# Patient Record
Sex: MALE | URBAN - METROPOLITAN AREA
[De-identification: names, ages, dates, MRNs, and addresses within clinical notes are randomized per-mention and may not be internally consistent; named-entity substitution may affect disease eponyms.]

---

## 2023-04-28 ENCOUNTER — OFFICE VISIT (OUTPATIENT)
Dept: INTERNAL MEDICINE CLINIC | Age: 23
End: 2023-04-28

## 2023-04-28 VITALS
SYSTOLIC BLOOD PRESSURE: 103 MMHG | BODY MASS INDEX: 27.96 KG/M2 | HEIGHT: 77 IN | RESPIRATION RATE: 8 BRPM | DIASTOLIC BLOOD PRESSURE: 64 MMHG | OXYGEN SATURATION: 98 % | WEIGHT: 236.8 LBS | TEMPERATURE: 98.2 F | HEART RATE: 84 BPM

## 2023-04-28 DIAGNOSIS — Z00.00 ROUTINE PHYSICAL EXAMINATION: ICD-10-CM

## 2023-04-28 DIAGNOSIS — Z51.81 MEDICATION MONITORING ENCOUNTER: Primary | ICD-10-CM

## 2023-04-28 DIAGNOSIS — R07.9 CHEST PAIN, UNSPECIFIED TYPE: ICD-10-CM

## 2023-04-28 DIAGNOSIS — K21.9 GASTROESOPHAGEAL REFLUX DISEASE WITHOUT ESOPHAGITIS: ICD-10-CM

## 2023-04-28 DIAGNOSIS — Z11.59 NEED FOR HEPATITIS C SCREENING TEST: ICD-10-CM

## 2023-04-28 RX ORDER — MIRTAZAPINE 30 MG/1
TABLET, FILM COATED ORAL
COMMUNITY

## 2023-04-28 RX ORDER — AMPHETAMINE 9.4 MG/1
TABLET, ORALLY DISINTEGRATING ORAL
COMMUNITY

## 2023-04-28 NOTE — PROGRESS NOTES
Chief Complaint   Patient presents with    Establish Care          1. \"Have you been to the ER, urgent care clinic since your last visit? Hospitalized since your last visit? \" No    2. \"Have you seen or consulted any other health care providers outside of the [address] [place] [address] since your last visit? \" No     3. For patients aged 39-70: Has the patient had a colonoscopy / FIT/ Cologuard? NA - based on age      If the patient is female:    4. For patients aged 41-77: Has the patient had a mammogram within the past 2 years? NA - based on age or sex      11. For patients aged 21-65: Has the patient had a pap smear?  NA - based on age or sex
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
## 2023-04-28 NOTE — PATIENT INSTRUCTIONS
avoid fried and fatty foods. peppermint, chocolate, alcohol, coffee, citrus fruits and juices, tomoato products; avoid lying down for 2 to 3 hours after eating.

## 2023-04-29 LAB
ALBUMIN SERPL-MCNC: 4.6 G/DL (ref 3.5–5)
ALBUMIN/GLOB SERPL: 1.4 (ref 1.1–2.2)
ALP SERPL-CCNC: 84 U/L (ref 45–117)
ALT SERPL-CCNC: 23 U/L (ref 12–78)
ANION GAP SERPL CALC-SCNC: 5 MMOL/L (ref 5–15)
AST SERPL-CCNC: 15 U/L (ref 15–37)
BASOPHILS # BLD: 0 K/UL (ref 0–0.1)
BASOPHILS NFR BLD: 1 % (ref 0–1)
BILIRUB SERPL-MCNC: 0.6 MG/DL (ref 0.2–1)
BUN SERPL-MCNC: 13 MG/DL (ref 6–20)
BUN/CREAT SERPL: 13 (ref 12–20)
CALCIUM SERPL-MCNC: 9.8 MG/DL (ref 8.5–10.1)
CHLORIDE SERPL-SCNC: 108 MMOL/L (ref 97–108)
CHOLEST SERPL-MCNC: 199 MG/DL
CO2 SERPL-SCNC: 27 MMOL/L (ref 21–32)
CREAT SERPL-MCNC: 1.02 MG/DL (ref 0.7–1.3)
DIFFERENTIAL METHOD BLD: ABNORMAL
EOSINOPHIL # BLD: 0.1 K/UL (ref 0–0.4)
EOSINOPHIL NFR BLD: 1 % (ref 0–7)
ERYTHROCYTE [DISTWIDTH] IN BLOOD BY AUTOMATED COUNT: 12.1 % (ref 11.5–14.5)
EST. AVERAGE GLUCOSE BLD GHB EST-MCNC: 91 MG/DL
GLOBULIN SER CALC-MCNC: 3.2 G/DL (ref 2–4)
GLUCOSE SERPL-MCNC: 94 MG/DL (ref 65–100)
HBA1C MFR BLD: 4.8 % (ref 4–5.6)
HCT VFR BLD AUTO: 50.9 % (ref 36.6–50.3)
HCV AB SER IA-ACNC: >11 INDEX
HCV AB SERPL QL IA: REACTIVE
HDLC SERPL-MCNC: 42 MG/DL
HDLC SERPL: 4.7 (ref 0–5)
HGB BLD-MCNC: 17.6 G/DL (ref 12.1–17)
IMM GRANULOCYTES # BLD AUTO: 0 K/UL (ref 0–0.04)
IMM GRANULOCYTES NFR BLD AUTO: 0 % (ref 0–0.5)
LDLC SERPL CALC-MCNC: 135.8 MG/DL (ref 0–100)
LYMPHOCYTES # BLD: 1.9 K/UL (ref 0.8–3.5)
LYMPHOCYTES NFR BLD: 31 % (ref 12–49)
MCH RBC QN AUTO: 31.9 PG (ref 26–34)
MCHC RBC AUTO-ENTMCNC: 34.6 G/DL (ref 30–36.5)
MCV RBC AUTO: 92.2 FL (ref 80–99)
MONOCYTES # BLD: 0.6 K/UL (ref 0–1)
MONOCYTES NFR BLD: 10 % (ref 5–13)
NEUTS SEG # BLD: 3.5 K/UL (ref 1.8–8)
NEUTS SEG NFR BLD: 57 % (ref 32–75)
NRBC # BLD: 0 K/UL (ref 0–0.01)
NRBC BLD-RTO: 0 PER 100 WBC
PLATELET # BLD AUTO: 297 K/UL (ref 150–400)
PMV BLD AUTO: 11 FL (ref 8.9–12.9)
POTASSIUM SERPL-SCNC: 4.3 MMOL/L (ref 3.5–5.1)
PROT SERPL-MCNC: 7.8 G/DL (ref 6.4–8.2)
RBC # BLD AUTO: 5.52 M/UL (ref 4.1–5.7)
SODIUM SERPL-SCNC: 140 MMOL/L (ref 136–145)
TRIGL SERPL-MCNC: 106 MG/DL (ref ?–150)
TSH SERPL DL<=0.05 MIU/L-ACNC: 0.61 UIU/ML (ref 0.36–3.74)
VLDLC SERPL CALC-MCNC: 21.2 MG/DL
WBC # BLD AUTO: 6.1 K/UL (ref 4.1–11.1)

## 2023-05-17 RX ORDER — AMPHETAMINE 9.4 MG/1
TABLET, ORALLY DISINTEGRATING ORAL
COMMUNITY

## 2023-05-17 RX ORDER — MIRTAZAPINE 30 MG/1
TABLET, FILM COATED ORAL
COMMUNITY

## 2024-01-15 ENCOUNTER — HOSPITAL ENCOUNTER (EMERGENCY)
Facility: HOSPITAL | Age: 24
Discharge: ANOTHER HOSPITAL | End: 2024-01-15
Attending: EMERGENCY MEDICINE
Payer: COMMERCIAL

## 2024-01-15 ENCOUNTER — HOSPITAL ENCOUNTER (INPATIENT)
Facility: HOSPITAL | Age: 24
LOS: 3 days | Discharge: HOME | DRG: 885 | End: 2024-01-18
Attending: PSYCHIATRY & NEUROLOGY | Admitting: PSYCHIATRY & NEUROLOGY
Payer: COMMERCIAL

## 2024-01-15 VITALS
HEART RATE: 69 BPM | RESPIRATION RATE: 18 BRPM | BODY MASS INDEX: 26.55 KG/M2 | DIASTOLIC BLOOD PRESSURE: 71 MMHG | TEMPERATURE: 99.1 F | WEIGHT: 229.5 LBS | OXYGEN SATURATION: 95 % | HEIGHT: 78 IN | SYSTOLIC BLOOD PRESSURE: 127 MMHG

## 2024-01-15 DIAGNOSIS — G47.00 INSOMNIA, UNSPECIFIED TYPE: Primary | ICD-10-CM

## 2024-01-15 DIAGNOSIS — F22 PARANOIA (HCC): ICD-10-CM

## 2024-01-15 DIAGNOSIS — F25.0 SCHIZOAFFECTIVE DISORDER, BIPOLAR TYPE (HCC): Primary | ICD-10-CM

## 2024-01-15 LAB
ALBUMIN SERPL-MCNC: 4 G/DL (ref 3.5–5)
ALBUMIN/GLOB SERPL: 1.3 (ref 1.1–2.2)
ALP SERPL-CCNC: 70 U/L (ref 45–117)
ALT SERPL-CCNC: 20 U/L (ref 12–78)
AMPHET UR QL SCN: NEGATIVE
ANION GAP SERPL CALC-SCNC: 6 MMOL/L (ref 5–15)
APAP SERPL-MCNC: 3 UG/ML (ref 10–30)
APPEARANCE UR: CLEAR
AST SERPL-CCNC: 12 U/L (ref 15–37)
BACTERIA URNS QL MICRO: NEGATIVE /HPF
BARBITURATES UR QL SCN: NEGATIVE
BASOPHILS # BLD: 0 K/UL (ref 0–0.1)
BASOPHILS NFR BLD: 1 % (ref 0–1)
BENZODIAZ UR QL: NEGATIVE
BILIRUB SERPL-MCNC: 0.6 MG/DL (ref 0.2–1)
BILIRUB UR QL: NEGATIVE
BUN SERPL-MCNC: 6 MG/DL (ref 6–20)
BUN/CREAT SERPL: 6 (ref 12–20)
CALCIUM SERPL-MCNC: 8.8 MG/DL (ref 8.5–10.1)
CANNABINOIDS UR QL SCN: NEGATIVE
CHLORIDE SERPL-SCNC: 107 MMOL/L (ref 97–108)
CO2 SERPL-SCNC: 26 MMOL/L (ref 21–32)
COCAINE UR QL SCN: NEGATIVE
COLOR UR: ABNORMAL
COMMENT:: NORMAL
CREAT SERPL-MCNC: 1.08 MG/DL (ref 0.7–1.3)
DIFFERENTIAL METHOD BLD: ABNORMAL
EKG ATRIAL RATE: 85 BPM
EKG DIAGNOSIS: NORMAL
EKG P AXIS: 56 DEGREES
EKG P-R INTERVAL: 134 MS
EKG Q-T INTERVAL: 368 MS
EKG QRS DURATION: 94 MS
EKG QTC CALCULATION (BAZETT): 437 MS
EKG R AXIS: 70 DEGREES
EKG T AXIS: 40 DEGREES
EKG VENTRICULAR RATE: 85 BPM
EOSINOPHIL # BLD: 0 K/UL (ref 0–0.4)
EOSINOPHIL NFR BLD: 1 % (ref 0–7)
EPITH CASTS URNS QL MICRO: ABNORMAL /LPF
ERYTHROCYTE [DISTWIDTH] IN BLOOD BY AUTOMATED COUNT: 11.3 % (ref 11.5–14.5)
ETHANOL SERPL-MCNC: <10 MG/DL (ref 0–0.08)
FLUAV AG NPH QL IA: NEGATIVE
FLUBV AG NOSE QL IA: NEGATIVE
GLOBULIN SER CALC-MCNC: 3.2 G/DL (ref 2–4)
GLUCOSE SERPL-MCNC: 112 MG/DL (ref 65–100)
GLUCOSE UR STRIP.AUTO-MCNC: NEGATIVE MG/DL
HCT VFR BLD AUTO: 41.7 % (ref 36.6–50.3)
HGB BLD-MCNC: 15.2 G/DL (ref 12.1–17)
HGB UR QL STRIP: NEGATIVE
HYALINE CASTS URNS QL MICRO: ABNORMAL /LPF (ref 0–5)
IMM GRANULOCYTES # BLD AUTO: 0 K/UL (ref 0–0.04)
IMM GRANULOCYTES NFR BLD AUTO: 0 % (ref 0–0.5)
KETONES UR QL STRIP.AUTO: ABNORMAL MG/DL
LEUKOCYTE ESTERASE UR QL STRIP.AUTO: NEGATIVE
LYMPHOCYTES # BLD: 1.2 K/UL (ref 0.8–3.5)
LYMPHOCYTES NFR BLD: 28 % (ref 12–49)
Lab: NORMAL
MCH RBC QN AUTO: 32.1 PG (ref 26–34)
MCHC RBC AUTO-ENTMCNC: 36.5 G/DL (ref 30–36.5)
MCV RBC AUTO: 88.2 FL (ref 80–99)
METHADONE UR QL: NEGATIVE
MONOCYTES # BLD: 0.5 K/UL (ref 0–1)
MONOCYTES NFR BLD: 11 % (ref 5–13)
NEUTS SEG # BLD: 2.6 K/UL (ref 1.8–8)
NEUTS SEG NFR BLD: 59 % (ref 32–75)
NITRITE UR QL STRIP.AUTO: NEGATIVE
NRBC # BLD: 0 K/UL (ref 0–0.01)
NRBC BLD-RTO: 0 PER 100 WBC
OPIATES UR QL: NEGATIVE
PCP UR QL: NEGATIVE
PH UR STRIP: 6 (ref 5–8)
PLATELET # BLD AUTO: 225 K/UL (ref 150–400)
PMV BLD AUTO: 9.6 FL (ref 8.9–12.9)
POTASSIUM SERPL-SCNC: 3.6 MMOL/L (ref 3.5–5.1)
PROT SERPL-MCNC: 7.2 G/DL (ref 6.4–8.2)
PROT UR STRIP-MCNC: ABNORMAL MG/DL
RBC # BLD AUTO: 4.73 M/UL (ref 4.1–5.7)
RBC #/AREA URNS HPF: ABNORMAL /HPF (ref 0–5)
SALICYLATES SERPL-MCNC: <1.7 MG/DL (ref 2.8–20)
SARS-COV-2 RNA RESP QL NAA+PROBE: NOT DETECTED
SODIUM SERPL-SCNC: 139 MMOL/L (ref 136–145)
SOURCE: NORMAL
SP GR UR REFRACTOMETRY: 1.03
SPECIMEN HOLD: NORMAL
UROBILINOGEN UR QL STRIP.AUTO: 0.2 EU/DL (ref 0.2–1)
WBC # BLD AUTO: 4.4 K/UL (ref 4.1–11.1)
WBC URNS QL MICRO: ABNORMAL /HPF (ref 0–4)

## 2024-01-15 PROCEDURE — 99285 EMERGENCY DEPT VISIT HI MDM: CPT

## 2024-01-15 PROCEDURE — 36415 COLL VENOUS BLD VENIPUNCTURE: CPT

## 2024-01-15 PROCEDURE — 80179 DRUG ASSAY SALICYLATE: CPT

## 2024-01-15 PROCEDURE — 80143 DRUG ASSAY ACETAMINOPHEN: CPT

## 2024-01-15 PROCEDURE — 81001 URINALYSIS AUTO W/SCOPE: CPT

## 2024-01-15 PROCEDURE — 90791 PSYCH DIAGNOSTIC EVALUATION: CPT

## 2024-01-15 PROCEDURE — 80053 COMPREHEN METABOLIC PANEL: CPT

## 2024-01-15 PROCEDURE — 85025 COMPLETE CBC W/AUTO DIFF WBC: CPT

## 2024-01-15 PROCEDURE — 93005 ELECTROCARDIOGRAM TRACING: CPT | Performed by: EMERGENCY MEDICINE

## 2024-01-15 PROCEDURE — 82077 ASSAY SPEC XCP UR&BREATH IA: CPT

## 2024-01-15 PROCEDURE — 87804 INFLUENZA ASSAY W/OPTIC: CPT

## 2024-01-15 PROCEDURE — 1240000000 HC EMOTIONAL WELLNESS R&B

## 2024-01-15 PROCEDURE — 80307 DRUG TEST PRSMV CHEM ANLYZR: CPT

## 2024-01-15 PROCEDURE — 87635 SARS-COV-2 COVID-19 AMP PRB: CPT

## 2024-01-15 ASSESSMENT — LIFESTYLE VARIABLES
HOW OFTEN DO YOU HAVE A DRINK CONTAINING ALCOHOL: 2-4 TIMES A MONTH
HOW MANY STANDARD DRINKS CONTAINING ALCOHOL DO YOU HAVE ON A TYPICAL DAY: 3 OR 4

## 2024-01-15 ASSESSMENT — ENCOUNTER SYMPTOMS
NAUSEA: 0
COUGH: 0
SINUS PRESSURE: 0
EYE REDNESS: 0
SINUS PAIN: 0
EYE PAIN: 0
ABDOMINAL PAIN: 0
COLOR CHANGE: 0
VOMITING: 0
TROUBLE SWALLOWING: 0
ABDOMINAL DISTENTION: 0
SHORTNESS OF BREATH: 0

## 2024-01-15 ASSESSMENT — PAIN - FUNCTIONAL ASSESSMENT
PAIN_FUNCTIONAL_ASSESSMENT: 0-10
PAIN_FUNCTIONAL_ASSESSMENT: NONE - DENIES PAIN

## 2024-01-15 ASSESSMENT — PAIN SCALES - GENERAL: PAINLEVEL_OUTOF10: 0

## 2024-01-15 NOTE — ED TRIAGE NOTES
Patient arrives to ED with father, repots taking 9 Trazodone pills last night between the hours of 8pm-4am \"in order to sleep\".  Patient reports he's been having trouble sleeping for a while. Denies SI/HI.  Patient also reports drinking four shakila last night    Reports history of schizoaffective and bipolar.

## 2024-01-15 NOTE — ED PROVIDER NOTES
[facility] EMERGENCY DEP  EMERGENCY DEPARTMENT ENCOUNTER      Pt Name: Willi Abdi  MRN: [id number]  Birthdate 2000  Date of evaluation: 1/15/2024  Provider: ELDON Forde NP      HISTORY OF PRESENT ILLNESS      This is a 23 year old male who presents to the emergency room with complaints of insomnia times 5 days. Patient states he has not slept in five days except for about 2 hours at a time. States last night between the hours of 8pm and 4am he took 9 Trazodone in an effort to sleep. Patient states he was not trying to hurt himself rather go to sleep. Patient adds he drank a four loco after he took the Trazodone and now feels like he \"needs help.\"  Denies any chest pain, shortness of breath, dizziness, nausea or vomiting, fever or chills. No ETOH or drug use except for one beer last night. Denies any THC use. Denies any current suicidal or homicidal ideations but does think he needs to stay in the hospital for some help. There are no further complaints at this time.     Poison control called by RN and a 4 hour observation period is needed.     The history is provided by the patient.           Nursing Notes were reviewed.    REVIEW OF SYSTEMS         Review of Systems   Constitutional:  Positive for fatigue. Negative for activity change, chills, diaphoresis and fever.   HENT:  Negative for congestion, sinus pressure, sinus pain and trouble swallowing.    Eyes:  Negative for pain, redness and visual disturbance.   Respiratory:  Negative for cough and shortness of breath.    Cardiovascular:  Negative for chest pain and palpitations.   Gastrointestinal:  Negative for abdominal distention, abdominal pain, nausea and vomiting.   Genitourinary:  Negative for difficulty urinating, frequency and urgency.   Musculoskeletal:  Negative for arthralgias, gait problem, neck pain and neck stiffness.   Skin:  Negative for color change, pallor, rash and wound.   Neurological:  Negative for dizziness, speech difficulty

## 2024-01-16 PROBLEM — F25.9 SCHIZOAFFECTIVE DISORDER (HCC): Status: ACTIVE | Noted: 2024-01-16

## 2024-01-16 PROBLEM — F20.0 PARANOID SCHIZOPHRENIA (HCC): Status: ACTIVE | Noted: 2024-01-16

## 2024-01-16 PROCEDURE — 99223 1ST HOSP IP/OBS HIGH 75: CPT | Performed by: PSYCHIATRY & NEUROLOGY

## 2024-01-16 PROCEDURE — 1240000000 HC EMOTIONAL WELLNESS R&B

## 2024-01-16 PROCEDURE — 6370000000 HC RX 637 (ALT 250 FOR IP): Performed by: PSYCHIATRY & NEUROLOGY

## 2024-01-16 PROCEDURE — 6370000000 HC RX 637 (ALT 250 FOR IP)

## 2024-01-16 RX ORDER — HYDROXYZINE HYDROCHLORIDE 25 MG/1
50 TABLET, FILM COATED ORAL 3 TIMES DAILY PRN
Status: DISCONTINUED | OUTPATIENT
Start: 2024-01-16 | End: 2024-01-18 | Stop reason: HOSPADM

## 2024-01-16 RX ORDER — TRAZODONE HYDROCHLORIDE 50 MG/1
50 TABLET ORAL NIGHTLY PRN
Status: DISCONTINUED | OUTPATIENT
Start: 2024-01-16 | End: 2024-01-16

## 2024-01-16 RX ORDER — PANTOPRAZOLE SODIUM 20 MG/1
20 TABLET, DELAYED RELEASE ORAL
Status: DISCONTINUED | OUTPATIENT
Start: 2024-01-17 | End: 2024-01-16 | Stop reason: SDUPTHER

## 2024-01-16 RX ORDER — POLYETHYLENE GLYCOL 3350 17 G/17G
17 POWDER, FOR SOLUTION ORAL DAILY PRN
Status: DISCONTINUED | OUTPATIENT
Start: 2024-01-16 | End: 2024-01-16

## 2024-01-16 RX ORDER — POLYETHYLENE GLYCOL 3350 17 G/17G
17 POWDER, FOR SOLUTION ORAL DAILY PRN
Status: DISCONTINUED | OUTPATIENT
Start: 2024-01-16 | End: 2024-01-18 | Stop reason: HOSPADM

## 2024-01-16 RX ORDER — ZOLPIDEM TARTRATE 5 MG/1
10 TABLET ORAL NIGHTLY PRN
Status: DISCONTINUED | OUTPATIENT
Start: 2024-01-16 | End: 2024-01-18 | Stop reason: HOSPADM

## 2024-01-16 RX ORDER — ACETAMINOPHEN 325 MG/1
650 TABLET ORAL EVERY 4 HOURS PRN
Status: DISCONTINUED | OUTPATIENT
Start: 2024-01-16 | End: 2024-01-18 | Stop reason: HOSPADM

## 2024-01-16 RX ORDER — MAGNESIUM HYDROXIDE/ALUMINUM HYDROXICE/SIMETHICONE 120; 1200; 1200 MG/30ML; MG/30ML; MG/30ML
30 SUSPENSION ORAL EVERY 6 HOURS PRN
Status: DISCONTINUED | OUTPATIENT
Start: 2024-01-16 | End: 2024-01-18 | Stop reason: HOSPADM

## 2024-01-16 RX ORDER — AMPHETAMINE 15.7 MG/1
15.7 TABLET, ORALLY DISINTEGRATING ORAL DAILY PRN
COMMUNITY

## 2024-01-16 RX ORDER — ACETAMINOPHEN 325 MG/1
650 TABLET ORAL EVERY 4 HOURS PRN
Status: DISCONTINUED | OUTPATIENT
Start: 2024-01-16 | End: 2024-01-16

## 2024-01-16 RX ORDER — TRAZODONE HYDROCHLORIDE 100 MG/1
100 TABLET ORAL NIGHTLY
Status: ON HOLD | COMMUNITY
End: 2024-01-18 | Stop reason: HOSPADM

## 2024-01-16 RX ORDER — POLYETHYLENE GLYCOL 3350 17 G
2 POWDER IN PACKET (EA) ORAL
Status: DISCONTINUED | OUTPATIENT
Start: 2024-01-16 | End: 2024-01-18 | Stop reason: HOSPADM

## 2024-01-16 RX ORDER — HALOPERIDOL 5 MG/1
5 TABLET ORAL EVERY 4 HOURS PRN
Status: DISCONTINUED | OUTPATIENT
Start: 2024-01-16 | End: 2024-01-18 | Stop reason: HOSPADM

## 2024-01-16 RX ORDER — HYDROXYZINE HYDROCHLORIDE 25 MG/1
50 TABLET, FILM COATED ORAL 3 TIMES DAILY PRN
Status: DISCONTINUED | OUTPATIENT
Start: 2024-01-16 | End: 2024-01-16

## 2024-01-16 RX ORDER — TRAZODONE HYDROCHLORIDE 50 MG/1
50 TABLET ORAL NIGHTLY
Status: DISCONTINUED | OUTPATIENT
Start: 2024-01-16 | End: 2024-01-16

## 2024-01-16 RX ORDER — HALOPERIDOL 5 MG/ML
5 INJECTION INTRAMUSCULAR EVERY 4 HOURS PRN
Status: DISCONTINUED | OUTPATIENT
Start: 2024-01-16 | End: 2024-01-18 | Stop reason: HOSPADM

## 2024-01-16 RX ORDER — PANTOPRAZOLE SODIUM 20 MG/1
20 TABLET, DELAYED RELEASE ORAL DAILY
Status: ON HOLD | COMMUNITY
End: 2024-01-18

## 2024-01-16 RX ORDER — DIPHENHYDRAMINE HYDROCHLORIDE 50 MG/ML
50 INJECTION INTRAMUSCULAR; INTRAVENOUS EVERY 4 HOURS PRN
Status: DISCONTINUED | OUTPATIENT
Start: 2024-01-16 | End: 2024-01-18 | Stop reason: HOSPADM

## 2024-01-16 RX ORDER — RISPERIDONE 2 MG/1
2 TABLET ORAL
Status: ON HOLD | COMMUNITY
End: 2024-01-18 | Stop reason: HOSPADM

## 2024-01-16 RX ORDER — PANTOPRAZOLE SODIUM 40 MG/1
40 TABLET, DELAYED RELEASE ORAL
Status: DISCONTINUED | OUTPATIENT
Start: 2024-01-16 | End: 2024-01-18 | Stop reason: HOSPADM

## 2024-01-16 RX ADMIN — HYDROXYZINE HYDROCHLORIDE 50 MG: 25 TABLET, FILM COATED ORAL at 21:35

## 2024-01-16 RX ADMIN — OLANZAPINE 15 MG: 5 TABLET, FILM COATED ORAL at 21:34

## 2024-01-16 RX ADMIN — HYDROXYZINE HYDROCHLORIDE 50 MG: 25 TABLET, FILM COATED ORAL at 06:04

## 2024-01-16 RX ADMIN — ZOLPIDEM TARTRATE 10 MG: 5 TABLET ORAL at 21:34

## 2024-01-16 RX ADMIN — PANTOPRAZOLE SODIUM 40 MG: 40 TABLET, DELAYED RELEASE ORAL at 11:33

## 2024-01-16 ASSESSMENT — SLEEP AND FATIGUE QUESTIONNAIRES
DO YOU HAVE DIFFICULTY SLEEPING: YES
AVERAGE NUMBER OF SLEEP HOURS: 4
DO YOU USE A SLEEP AID: YES
SLEEP PATTERN: DIFFICULTY FALLING ASLEEP
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
## 2024-01-16 NOTE — PLAN OF CARE
Problem: Anxiety  Goal: Will report anxiety at manageable levels  Description: INTERVENTIONS:  1. Administer medication as ordered  2. Teach and rehearse alternative coping skills  3. Provide emotional support with 1:1 interaction with staff  Outcome: Not Progressing     Problem: Coping  Goal: Pt/Family able to verbalize concerns and demonstrate effective coping strategies  Description: INTERVENTIONS:  1. Assist patient/family to identify coping skills, available support systems and cultural and spiritual values  2. Provide emotional support, including active listening and acknowledgement of concerns of patient and caregivers  3. Reduce environmental stimuli, as able  4. Instruct patient/family in relaxation techniques, as appropriate  5. Assess for spiritual pain/suffering and initiate Spiritual Care, Psychosocial Clinical Specialist consults as needed  Outcome: Not Progressing     Problem: Decision Making  Goal: Pt/Family able to effectively weigh alternatives and participate in decision making related to treatment and care  Description: INTERVENTIONS:  1. Determine when there are differences between patient's view, family's view, and healthcare provider's view of condition  2. Facilitate patient and family articulation of goals for care  3. Help patient and family identify pros/cons of alternative solutions  4. Provide information as requested by patient/family  5. Respect patient/family right to receive or not to receive information  6. Serve as a liaison between patient and family and health care team  7. Initiate Consults from Ethics, Palliative Care or initiate Family Care Conference as is appropriate  Outcome: Not Progressing     Problem: Behavior  Goal: Pt/Family maintain appropriate behavior and adhere to behavioral management agreement, if implemented  Description: INTERVENTIONS:  1. Assess patient/family's coping skills and  non-compliant behavior (including use of illegal substances)  2. Notify

## 2024-01-16 NOTE — ED NOTES
TRANSFER - OUT REPORT:    Verbal report given to Kelsi Carreon RN on Willi Abdi  being transferred to [facility] 308-1 for routine progression of patient care       Report consisted of patient's Situation, Background, Assessment and   Recommendations(SBAR).     Information from the following report(s) Nurse Handoff Report, Index, ED Encounter Summary, ED SBAR, Adult Overview, MAR, and Recent Results was reviewed with the receiving nurse.    [place] Fall Assessment:    Presents to emergency department  because of falls (Syncope, seizure, or loss of consciousness): No  Age > 70: No  Altered Mental Status, Intoxication with alcohol or substance confusion (Disorientation, impaired judgment, poor safety awaremess, or inability to follow instructions): No  Impaired Mobility: Ambulates or transfers with assistive devices or assistance; Unable to ambulate or transer.: No  Nursing Judgement: No          Lines:       Opportunity for questions and clarification was provided.      Patient transported with:  [facility] ETA 2200  [facility] here to transport patient to [facility]. All paperwork, EMTALA and verbal report given to transport team. All questions answered. Pt in stable condition, no acute distress noted.      All personal belongings accompanied patient upon discharge.

## 2024-01-16 NOTE — PROGRESS NOTES
[facility] Admission Pharmacy Medication Reconciliation    Information obtained from: Patient's medication bottles, RxQuery  RxQuery data available1: RxQuery    Comments/recommendations:  Patient reports compliance with medications.  However, family believes patient has not been compliant with medications.  Of note, only 2 risperidone tablets left in his bottle for a prescription filled mid-December 2023.     Reports not taking his amphetamine for ~1 week due to having trouble sleeping.     The [place] Prescription Monitoring Program () was accessed to determine fill history of any controlled medications. Routinely fills amphetamine ODT monthly, prescribed by Cesar Palma.  Last filled #30 tablets on 12/27/23.     Medication changes (since last review):  Removed  Mirtazapine  Adjusted  Amphetamine ODT dose and frequency       1RxQuery pharmacy benefit data reflects medications filled and processed through the patient's insurance, however                this data does NOT capture whether the medication was picked up or is currently being taken by the patient.         Patient allergies:   Allergies as of 01/15/2024    (No Known Allergies)         Prior to Admission Medications   Prescriptions Last Dose Informant   Amphetamine ER (ADZENYS XR-ODT) 15.7 MG TBED Past Week    Sig: Take 15.7 mg by mouth daily as needed (attention/focus).   pantoprazole (PROTONIX) 20 MG tablet 1/15/2024    Sig: Take 1 tablet by mouth daily   risperiDONE (RISPERDAL) 2 MG tablet 1/15/2024    Sig: Take 1 tablet by mouth nightly   traZODone (DESYREL) 100 MG tablet 1/15/2024    Sig: Take 1 tablet by mouth nightly      Facility-Administered Medications: None          Thank you,  Sirisha Lamar, PharmD, BCPS, BCPP  Clinical Pharmacy Specialist, Behavioral Health  Desk: [phone] ([phone])  Pharmacy: [phone] ([phone])

## 2024-01-16 NOTE — GROUP NOTE
Group Therapy Note    Date: 1/16/2024    Group Start Time: 1400  Group End Time: 1500  Group Topic: Recreational    [facility] 3 ACUTE BEHAV TH    Yessi Coats        Group Therapy Note           Patient's Goal:  To participate in relaxation activity    Notes:  Pt did not attend session    Discipline Responsible: Recreational Therapist      Signature:  YESSI COATS

## 2024-01-16 NOTE — PLAN OF CARE
Problem: Discharge Planning  Goal: Discharge to home or other facility with appropriate resources  Outcome: Progressing     Problem: ABCDS Injury Assessment  Goal: Absence of physical injury  Outcome: Progressing     Problem: Anxiety  Goal: Will report anxiety at manageable levels  Description: INTERVENTIONS:  1. Administer medication as ordered  2. Teach and rehearse alternative coping skills  3. Provide emotional support with 1:1 interaction with staff  Outcome: Progressing     Problem: Coping  Goal: Pt/Family able to verbalize concerns and demonstrate effective coping strategies  Description: INTERVENTIONS:  1. Assist patient/family to identify coping skills, available support systems and cultural and spiritual values  2. Provide emotional support, including active listening and acknowledgement of concerns of patient and caregivers  3. Reduce environmental stimuli, as able  4. Instruct patient/family in relaxation techniques, as appropriate  5. Assess for spiritual pain/suffering and initiate Spiritual Care, Psychosocial Clinical Specialist consults as needed  Outcome: Progressing     Problem: Decision Making  Goal: Pt/Family able to effectively weigh alternatives and participate in decision making related to treatment and care  Description: INTERVENTIONS:  1. Determine when there are differences between patient's view, family's view, and healthcare provider's view of condition  2. Facilitate patient and family articulation of goals for care  3. Help patient and family identify pros/cons of alternative solutions  4. Provide information as requested by patient/family  5. Respect patient/family right to receive or not to receive information  6. Serve as a liaison between patient and family and health care team  7. Initiate Consults from Ethics, Palliative Care or initiate Family Care Conference as is appropriate  Outcome: Progressing     Problem: Behavior  Goal: Pt/Family maintain appropriate behavior and adhere

## 2024-01-16 NOTE — PROGRESS NOTES
Writer met with patient in his room. Patient was resting quietly in bed and appeared calm/free from distress. Patient was cooperative with assessment. Patient is AOx4. Patient presents with a flat affect and depressed mood. Patient denies depression, SI, HI, and AVH. Patient endorses some anxiety related to the afterlife. Patient states he recently became \"Adventist\". Patient is noted to have Adventist delusions regarding \"the antichrist\". Patient is medication and meal compliant. Q15 minute safety checks maintained.

## 2024-01-16 NOTE — PROGRESS NOTES
Laboratory Monitoring for Mood Stabilizers and Antipsychotics    Recommended baseline monitoring has been completed based on this patient's current medication regimen.     This patient is currently prescribed the following medication(s):   Current Facility-Administered Medications: nicotine polacrilex (COMMIT) lozenge 2 mg, 2 mg, Oral, Q1H PRN  OLANZapine (ZYPREXA) tablet 15 mg, 15 mg, Oral, Nightly  zolpidem (AMBIEN) tablet 10 mg, 10 mg, Oral, Nightly PRN  acetaminophen (TYLENOL) tablet 650 mg, 650 mg, Oral, Q4H PRN  polyethylene glycol (GLYCOLAX) packet 17 g, 17 g, Oral, Daily PRN  aluminum & magnesium hydroxide-simethicone (MAALOX) 200-200-20 MG/5ML suspension 30 mL, 30 mL, Oral, Q6H PRN  hydrOXYzine HCl (ATARAX) tablet 50 mg, 50 mg, Oral, TID PRN  haloperidol (HALDOL) tablet 5 mg, 5 mg, Oral, Q4H PRN **OR** haloperidol lactate (HALDOL) injection 5 mg, 5 mg, IntraMUSCular, Q4H PRN  diphenhydrAMINE (BENADRYL) injection 50 mg, 50 mg, IntraMUSCular, Q4H PRN  pantoprazole (PROTONIX) tablet 40 mg, 40 mg, Oral, QAM AC    The following labs have been completed for monitoring of antipsychotics and/or mood stabilizers:    Height, Weight, BMI Estimation  Estimated body mass index is 26.61 kg/m² as calculated from the following:    Height as of this encounter: 1.956 m (6' 5\").    Weight as of this encounter: 101.8 kg (224 lb 6.9 oz).     Vital Signs/Blood Pressure  /61   Pulse 94   Temp 97.8 °F (36.6 °C) (Oral)   Resp 16   Ht 1.956 m (6' 5\")   Wt 101.8 kg (224 lb 6.9 oz)   SpO2 98%   BMI 26.61 kg/m²     Renal Function, Hepatic Function and Chemistry  Estimated Creatinine Clearance: 132 mL/min (based on SCr of 1.08 mg/dL).    Lab Results   Component Value Date/Time     01/15/2024 02:13 PM    K 3.6 01/15/2024 02:13 PM     01/15/2024 02:13 PM    CO2 26 01/15/2024 02:13 PM    ANIONGAP 6 01/15/2024 02:13 PM    GLUCOSE 112 01/15/2024 02:13 PM    BUN 6 01/15/2024 02:13 PM    CREATININE 1.08 01/15/2024

## 2024-01-16 NOTE — GROUP NOTE
Group Therapy Note    Date: 1/16/2024    Group Start Time: 1100  Group End Time: 1200  Group Topic: Topic Group    [facility] 3 ACUTE BEHAV [facility]    Yessi Coats        Group Therapy Note           Patient's Goal:  To participate in relaxation activity    Notes:  Pt did not attend session    Discipline Responsible: Recreational Therapist      Signature:  YESSI COATS

## 2024-01-16 NOTE — ED NOTES
Signout taken from Heather Presley NP, pending admission to psych.  During handoff, it was noted that the COVID swab must have been discontinued at some point, COVID swab reordered to complete medical clearance workup.  BETTY Townsend Lindsay H, PA  01/15/24 2361

## 2024-01-16 NOTE — CARE COORDINATION
01/16/24 0734   ITP   Date of Plan 01/16/24   Date of Next Review 01/17/24   Primary Diagnosis Code Schizoaffective disorder   Barriers to Treatment Need for psychoeducation   Strengths Incorporated in Plan Family supports;Acknowledging need for assistance   Plan of Care   Long Term Goal (LTG) Stated in patient/guardian terms To maintain mental health in the community   Short Term Goal 1   Short Term Goal 1 Client will learn and demonstrate improved self management skills   Baseline Functioning Client reports taking 8-9 trazodone to fall asleep   Target Client will manage self safely at home and in the community   Objectives Other (comment)  (Client will learn self management skills)   Intervention 1 Referral to community services;Family involvement in treatment plan;Assess safety;Crisis support;Monitor medications   Frequency On going   Measured by Staff observation;Self report   Staff Responsible Clinical staff;[facility] staff   STG Goal 1 Status: Patient Appears to be  Progressing toward treatment plan goal   Short Term Goal 2   Short Term Goal 2 Client will learn and demonstrate effective coping skills   Baseline Functioning Client reports poor coping   Target Client will use healthy coping skills   Objectives Other (comment)  (Client will learn new coping skills)   Intervention 1 Group therapy;Milieu therapy and support   Frequency Daily   Measured by Staff observation;Self report   Staff Responsible Clinical staff;[facility] staff   STG Goal 2 Status: Patient Appears to be  Progressing toward treatment plan goal   Crisis/Safety/Discharge Plan   Crisis/Safety Plan Standard program interventions and protocol   Comprehensive Assessment Completion Date 01/16/24   Discharge Plan Home with family

## 2024-01-17 PROCEDURE — 6370000000 HC RX 637 (ALT 250 FOR IP)

## 2024-01-17 PROCEDURE — 1240000000 HC EMOTIONAL WELLNESS R&B

## 2024-01-17 PROCEDURE — 99232 SBSQ HOSP IP/OBS MODERATE 35: CPT | Performed by: PSYCHIATRY & NEUROLOGY

## 2024-01-17 PROCEDURE — 6370000000 HC RX 637 (ALT 250 FOR IP): Performed by: PSYCHIATRY & NEUROLOGY

## 2024-01-17 RX ADMIN — HYDROXYZINE HYDROCHLORIDE 50 MG: 25 TABLET, FILM COATED ORAL at 20:56

## 2024-01-17 RX ADMIN — NICOTINE POLACRILEX 2 MG: 2 LOZENGE ORAL at 20:02

## 2024-01-17 RX ADMIN — ZOLPIDEM TARTRATE 10 MG: 5 TABLET ORAL at 20:56

## 2024-01-17 RX ADMIN — PANTOPRAZOLE SODIUM 40 MG: 40 TABLET, DELAYED RELEASE ORAL at 08:20

## 2024-01-17 RX ADMIN — OLANZAPINE 15 MG: 5 TABLET, FILM COATED ORAL at 20:56

## 2024-01-17 NOTE — GROUP NOTE
Group Therapy Note    Date: 1/17/2024    Group Start Time: 1400  Group End Time: 1500  Group Topic: Recreational    [facility] 3 ACUTE BEHAV HLTH    Yessi Coats        Group Therapy Note           Patient's Goal:  To concentrate on selected task    Notes:  Pt declined active participation-left session early    Discipline Responsible: Recreational Therapist      Signature:  YESSI COATS

## 2024-01-17 NOTE — PLAN OF CARE
Problem: Anxiety  Goal: Will report anxiety at manageable levels  Description: INTERVENTIONS:  1. Administer medication as ordered  2. Teach and rehearse alternative coping skills  3. Provide emotional support with 1:1 interaction with staff  1/17/2024 1034 by Talisha Trejo RN  Outcome: Progressing  1/16/2024 2244 by Musa Elder RN  Outcome: Not Progressing     Problem: Coping  Goal: Pt/Family able to verbalize concerns and demonstrate effective coping strategies  Description: INTERVENTIONS:  1. Assist patient/family to identify coping skills, available support systems and cultural and spiritual values  2. Provide emotional support, including active listening and acknowledgement of concerns of patient and caregivers  3. Reduce environmental stimuli, as able  4. Instruct patient/family in relaxation techniques, as appropriate  5. Assess for spiritual pain/suffering and initiate Spiritual Care, Psychosocial Clinical Specialist consults as needed  1/17/2024 1034 by Talisha Trejo RN  Outcome: Progressing  1/16/2024 2244 by Musa Elder RN  Outcome: Not Progressing

## 2024-01-17 NOTE — H&P
E/M Psychiatric Progress Note    Patient: Willi Abdi MRN: [id number]  SSN: [id number]    YOB: 2000  Age: 23 y.o.  Sex: male      Admit Date: 1/15/2024    LOS: 2 days     Chief Complaint: psychosis    Subjective:     HPI / INTERVAL HISTORY:    The patient, Willi Abdi, is a 23 y.o.  Declined male with a past psychiatric history significant for schizophrenia, who presents at this time with complaints of (and/or evidence of) the following emotional symptoms: delusions, insomnia, and psychotic behavior.  Additional symptomatology include intentional overdose of sleep agents.  The above symptoms have been present for 2+ weeks. These symptoms are of moderate to high severity. These symptoms are constant in nature.  The patient's condition has been precipitated by psychosocial stressors. UDS: negative; BAL=0.     The patient presented to the ED with c/o insomnia, he was noted to be disorganized and delusional. He was admitted due to psychiatric decompensation despite stated medication compliance. He was noted to be in marked distress, stating he is becoming \"more Hoahaoism\" and with fear about the afterlife and unknown.      The patient is a fair historian. The patient corroborates the above narrative. The patient contracts for safety on the unit and gives consent for the team to contact collateral. The patient is amenable to initiating treatment while on the unit. He stats that he did not mean to take his own life when he overdosed on the medication, though he acknowledges that his family does believe this was the case. He was previously taking amphetamine for ADHD but stopped due to the insomnia.    01/17 - no acute overnight events. Patient has been isolative, guarded but medication compliant. He slept 7.75 hours overnight, and was less internally preoccupied. Patient has been tolerating medication titration thus far. He denies SI/HI and is reluctant to discuss his thoughts with the group, though he 
obtain labwork for all medications for which routine monitoring is recommended and agents that require intense monitoring for toxicity as deemed appropriate based on current medication side effects and pharmacodynamically determined drug 1/2 lives.    A coordinated, multidisplinary treatment team (includes the nurse, unit pharmacist,  and writer) round was conducted for this initial evaluation with the patient present.     The following regarding medications was addressed during rounds with patient: the risks and benefits of the proposed medication. The patient was given the opportunity to ask questions. Informed consent given to the use of the above medications.    I will continue to adjust psychiatric and non-psychiatric medications (see above \"medication\" section and orders section for details) as deemed appropriate & based upon diagnoses and response to treatment. I have reviewed admission (and previous/old) labs and medical tests in the EHR and or transferring hospital documents. I will continue to order blood tests/labs and diagnostic tests as deemed appropriate and review results as they become available (see orders for details). I have reviewed old psychiatric and medical records available in the EHR. I Will order additional psychiatric records from other institutions to further elucidate the nature of patient's psychopathology and review once available.    I will gather additional collateral information from friends, family and o/p treatment team to further elucidate the nature of patient's psychopathology and baselline level of psychiatric functioning.    I certify that this patient's inpatient psychiatric hospital services are required for treatment that could reasonably be expected to improve the patient's condition, or for diagnostic study, and that the patient continues to need, on a daily basis, active treatment furnished directly by or requiring the supervision of inpatient psychiatric

## 2024-01-17 NOTE — PLAN OF CARE
Problem: Anxiety  Goal: Will report anxiety at manageable levels  Description: INTERVENTIONS:  1. Administer medication as ordered  2. Teach and rehearse alternative coping skills  3. Provide emotional support with 1:1 interaction with staff  1/16/2024 2244 by Musa Elder RN  Outcome: Not Progressing     Problem: Coping  Goal: Pt/Family able to verbalize concerns and demonstrate effective coping strategies  Description: INTERVENTIONS:  1. Assist patient/family to identify coping skills, available support systems and cultural and spiritual values  2. Provide emotional support, including active listening and acknowledgement of concerns of patient and caregivers  3. Reduce environmental stimuli, as able  4. Instruct patient/family in relaxation techniques, as appropriate  5. Assess for spiritual pain/suffering and initiate Spiritual Care, Psychosocial Clinical Specialist consults as needed  1/16/2024 2244 by Musa Elder RN  Outcome: Not Progressing

## 2024-01-17 NOTE — PROGRESS NOTES
Writer met with patient in his room. Patient was resting quietly in bed and appeared calm/free from distress. Patient was cooperative with assessment. Patient is Aox4. Patient presents with flat affect and depressed mood. Patient endorses slight anxiety and depression but does not request prn medication at this time. Patient denies SI, HI, and AVH. Patient is medication and meal compliant. Patient states he feels he was able to sleep better last night as a result of medication. Patient was visible in the hallway and dayroom at times but remains mostly isolative to his room. Q15 minute safety checks maintained.

## 2024-01-18 VITALS
HEART RATE: 70 BPM | BODY MASS INDEX: 26.5 KG/M2 | WEIGHT: 224.43 LBS | OXYGEN SATURATION: 99 % | HEIGHT: 77 IN | DIASTOLIC BLOOD PRESSURE: 69 MMHG | SYSTOLIC BLOOD PRESSURE: 121 MMHG | TEMPERATURE: 97.6 F | RESPIRATION RATE: 17 BRPM

## 2024-01-18 PROCEDURE — 6370000000 HC RX 637 (ALT 250 FOR IP): Performed by: PSYCHIATRY & NEUROLOGY

## 2024-01-18 PROCEDURE — 99239 HOSP IP/OBS DSCHRG MGMT >30: CPT | Performed by: PSYCHIATRY & NEUROLOGY

## 2024-01-18 RX ORDER — ZOLPIDEM TARTRATE 10 MG/1
10 TABLET ORAL NIGHTLY PRN
Qty: 21 TABLET | Refills: 0 | Status: SHIPPED | OUTPATIENT
Start: 2024-01-18 | End: 2024-02-08

## 2024-01-18 RX ORDER — OLANZAPINE 15 MG/1
15 TABLET ORAL NIGHTLY
Qty: 30 TABLET | Refills: 1 | Status: SHIPPED | OUTPATIENT
Start: 2024-01-18 | End: 2024-01-18

## 2024-01-18 RX ORDER — OLANZAPINE 15 MG/1
15 TABLET ORAL NIGHTLY
Qty: 30 TABLET | Refills: 1 | Status: SHIPPED | OUTPATIENT
Start: 2024-01-18

## 2024-01-18 RX ORDER — PANTOPRAZOLE SODIUM 20 MG/1
20 TABLET, DELAYED RELEASE ORAL DAILY
Qty: 30 TABLET | Refills: 1 | Status: SHIPPED | OUTPATIENT
Start: 2024-01-18

## 2024-01-18 RX ORDER — HYDROXYZINE 50 MG/1
50 TABLET, FILM COATED ORAL 2 TIMES DAILY PRN
Qty: 60 TABLET | Refills: 1 | Status: SHIPPED | OUTPATIENT
Start: 2024-01-18 | End: 2024-03-18

## 2024-01-18 RX ADMIN — PANTOPRAZOLE SODIUM 40 MG: 40 TABLET, DELAYED RELEASE ORAL at 09:13

## 2024-01-18 NOTE — PLAN OF CARE
Problem: Depression/Self Harm  Goal: Effect of psychiatric condition will be minimized and patient will be protected from self harm  Description: INTERVENTIONS:  1. Assess impact of patient's symptoms on level of functioning, self care needs and offer support as indicated  2. Assess patient/family knowledge of depression, impact on illness and need for teaching  3. Provide emotional support, presence and reassurance  4. Assess for possible suicidal thoughts or ideation. If patient expresses suicidal thoughts or statements do not leave alone, initiate Suicide Precautions, move to a room close to the nursing station and obtain sitter  5. Initiate consults as appropriate with Mental Health Professional, Spiritual Care, Psychosocial CNS, and consider a recommendation to the LIP for a Psychiatric Consultation  Outcome: Progressing

## 2024-01-18 NOTE — PROGRESS NOTES
RN Reassessment Note 7pm-7am:    Willi appears to be in a calm and pleasant mood, he was isolative to self but visible on unit. His speech is clear with a normal volume. His affect is euthymic. His insight into problems is good and his judgment appears to be good as well. Willi rates feelings of depression and anxiety both 2/10. Pt denies, SI, HI, AVH. He also denies pain.     Nursing interventions:   Willi is compliant with medications. He showed no signs of distress or complaints of adverse reactions to medications. PRN medications given during this shift for anxiety and sleep aid.      Willi was given emotional support and encouragement for taking his scheduled medication. His response to this intervention appeared to be favorable, Willi slept for a total of 8 hrs.

## 2024-01-18 NOTE — DISCHARGE INSTRUCTIONS
DISCHARGE SUMMARY    NAME:Willi Abdi  : 2000  MRN: [id number]    The patient Willi Abdi exhibits the ability to control behavior in a less restrictive environment.  Patient's level of functioning is improving.  No assaultive/destructive behavior has been observed for the past 24 hours.  No suicidal/homicidal threat or behavior has been observed for the past 24 hours.  There is no evidence of serious medication side effects.  Patient has not been in physical or protective restraints for at least the past 24 hours.    If weapons involved, how are they secured? No access    Is patient aware of and in agreement with discharge plan? Yes    Arrangements for medication:  Prescriptions sent to pharmacy     Copy of discharge instructions to provider?:  Yes    Arrangements for transportation home:  friend    Keep all follow up appointments as scheduled, continue to take prescribed medications per physician instructions.  Mental health crisis number:  911 or your local mental health crisis line number at [phone]      Mental Health Emergency WARM LINE      9-668-671-MHAV ([phone])      M-F: 9am to 9pm      Sat & Sun: 5pm - 9pm  National suicide prevention lines:                             4-025-HUCTCHB (9-279-338-6496)       2-858-109-TALK ([phone])    Crisis Text Line:  Text HOME to 753944

## 2024-01-18 NOTE — PLAN OF CARE
Problem: Discharge Planning  Goal: Discharge to home or other facility with appropriate resources  Outcome: Adequate for Discharge     Problem: ABCDS Injury Assessment  Goal: Absence of physical injury  Outcome: Adequate for Discharge     Problem: Anxiety  Goal: Will report anxiety at manageable levels  Description: INTERVENTIONS:  1. Administer medication as ordered  2. Teach and rehearse alternative coping skills  3. Provide emotional support with 1:1 interaction with staff  Outcome: Adequate for Discharge     Problem: Coping  Goal: Pt/Family able to verbalize concerns and demonstrate effective coping strategies  Description: INTERVENTIONS:  1. Assist patient/family to identify coping skills, available support systems and cultural and spiritual values  2. Provide emotional support, including active listening and acknowledgement of concerns of patient and caregivers  3. Reduce environmental stimuli, as able  4. Instruct patient/family in relaxation techniques, as appropriate  5. Assess for spiritual pain/suffering and initiate Spiritual Care, Psychosocial Clinical Specialist consults as needed  Outcome: Adequate for Discharge     Problem: Decision Making  Goal: Pt/Family able to effectively weigh alternatives and participate in decision making related to treatment and care  Description: INTERVENTIONS:  1. Determine when there are differences between patient's view, family's view, and healthcare provider's view of condition  2. Facilitate patient and family articulation of goals for care  3. Help patient and family identify pros/cons of alternative solutions  4. Provide information as requested by patient/family  5. Respect patient/family right to receive or not to receive information  6. Serve as a liaison between patient and family and health care team  7. Initiate Consults from Ethics, Palliative Care or initiate Family Care Conference as is appropriate  Outcome: Adequate for Discharge     Problem:

## 2024-01-18 NOTE — BH NOTE
E/M Psychiatric Progress Note    Patient: Willi Abdi MRN: [id number]  SSN: [id number]    YOB: 2000  Age: 23 y.o.  Sex: male      Admit Date: 1/15/2024    LOS: 2 days     Chief Complaint: psychosis    Subjective:     HPI / INTERVAL HISTORY:    The patient, Willi Abdi, is a 23 y.o.  Declined male with a past psychiatric history significant for schizophrenia, who presents at this time with complaints of (and/or evidence of) the following emotional symptoms: delusions, insomnia, and psychotic behavior.  Additional symptomatology include intentional overdose of sleep agents.  The above symptoms have been present for 2+ weeks. These symptoms are of moderate to high severity. These symptoms are constant in nature.  The patient's condition has been precipitated by psychosocial stressors. UDS: negative; BAL=0.     The patient presented to the ED with c/o insomnia, he was noted to be disorganized and delusional. He was admitted due to psychiatric decompensation despite stated medication compliance. He was noted to be in marked distress, stating he is becoming \"more Oriental orthodox\" and with fear about the afterlife and unknown.      The patient is a fair historian. The patient corroborates the above narrative. The patient contracts for safety on the unit and gives consent for the team to contact collateral. The patient is amenable to initiating treatment while on the unit. He stats that he did not mean to take his own life when he overdosed on the medication, though he acknowledges that his family does believe this was the case. He was previously taking amphetamine for ADHD but stopped due to the insomnia.    01/17 - no acute overnight events. Patient has been isolative, guarded but medication compliant. He slept 7.75 hours overnight, and was less internally preoccupied. Patient has been tolerating medication titration thus far. He denies SI/HI and is reluctant to discuss his thoughts with the group, though he 
Behavioral Health Treatment Team Note     Patient goal(s) for today: Take medications as prescribed, engage in unit activities Patient Continue taking meds as, participate in hygiene/ADLs, prepare for discharge, follow directions from staff, contact support team, attend groups      Treatment team focus/goals: will continue to maintain a calm and cooperative mood and communicate his needs with staff.  Continue taking meds as prescribed, engage in unit activities, participate in hygiene/ADLs, prepare for discharge, follow directions from staff, contact support team, attend groups     Progress note: Pt met with treament team. Pt presents aox4. Pt presents guarded. Pt presents with flat affect. Pt denies SI/HI. Pt presents less internally preoccupied and reports thoughts about Adventist are no longer bothering him. SW informed pt of pt mother's concerns over drinking, and pt reports he has no problem quitting drinking without the help of medication.     LOS:  2  Expected LOS: TBD    Insurance info/prescription coverage:  BCBS  Date of last family contact:  today, mom  Family requesting physician contact today:  No  Discharge plan:  home  Guns in the home:  No  Outpatient provider(s):  to be linked    Participating treatment team members: Jazmín Grewal, MSW student, Navya Degroot, supervisee in social work, Asher Núñez MD  
GROUP THERAPY PROGRESS NOTE    Willi Abdi Did not participate in community group.     
PSYCHOSOCIAL ASSESSMENT  :Patient identifying info:   Willi Abdi is a 23 y.o., male admitted 1/15/2024 10:39 PM     Presenting problem and precipitating factors: Patient is a 23 year old white male with a hx of Schizophrenia who voluntarily admitted himself to the hospital due to increased U[place]din preoccupation around after life and report of insomnia for the past week. Patient reports that he was not sleeping well and ended up taking 9 trazodone pills in effort to help him sleep. Patient reports that this was not in any attempt to take his life, just to help get sleep. Patient was recently discharged from [facility] and was prescribed anti-psychotics. Patient reports that he has been drinking two cups of coffee per day and stopped taking his ADHD medications, in effort to sleep last week.      Mental status assessment: alert and oriented x 4. Patient presents with a depressed and flat mood and anxious thought process    Strengths/Recreation/Coping Skills: outpatient support, stable housing, family support, employment    Collateral information: [facility] [address] [phone]    Current psychiatric /substance abuse providers and contact info:     Previous psychiatric/substance abuse providers and response to treatment: [facility] one month ago    Family history of mental illness or substance abuse: denies    Substance abuse history:  UDS negative   Social History     Tobacco Use    Smoking status: Former    Smokeless tobacco: Never   Substance Use Topics    Alcohol use: Yes       History of biomedical complications associated with substance abuse:     Patient's current acceptance of treatment or motivation for change: fair    Family constellation: Patient is single never  with no children     Is significant other involved? no    Describe support system: Mom and brother    Describe living arrangements and home environment: lives with mom and brother    GUARDIAN/POA: No    Guardian Name:     Guardian 
Patient encountered lying down calmly in his room,endorsed anxiety 4/10 and depression 4/10. PRN was given for anxiety as requested by the patient. Patient is not in distress,med complaint with no obvious side effect. Close monitoring done for safety precaution. He slept for 7.45hrs.  
Willi Burnett #[id number] (CSN:[id number]) (:2000 23 y.o. M) (Adm: 01/15/24)  [id number]  PCP    KERI PERERA  Demographics  CommentAddress   [address] [facility] [address] [facility] [address]    Home Phone   [phone]    Work Phone       Mobile Phone   [phone]             Social Security Number       Insurance Information   BCBS    Marital Status   Single    Latter-day   Unknown               Insurance Payors as of 2024    BCBS    Plan[place] Group: [id number] Member: [id number]   Effective from: 2019 Subscriber: Roshni Burnett Subscriber ID: [id number]   Guarantor: WILLI BURNETT     [place]    [place] Member: [id number] Effective from: 2024   Subscriber: WILLI BURNETT Subscriber ID: [id number] Guarantor: WILLI BURNETT     Documents Filed to Patient    Power of  Living Will Clinical Unknown Study Attachment Consent Form ABN Waiver After Visit Summary Lab Result Scan Code Status MyChart Status Advance Care Planning    Not on File  Not on File  Not on File  Not on File  Not on File  Not on File  Not on File  Not on File  FULL [Updated on 24]  Pending Jump to the Activity      Auth/Cert Information    Open auth/cert linked to hospital account [id number]      Patient Contacts    Name Relation Home Work Mobile   Roshni Burnett Parent   [phone]     Admission Information    Current Information    Attending Provider Admitting Provider Admission Type Admission Status   Kye Núñez MD Ali, Zaffar M, MD Elective Confirmed Admission          Admission Date/Time Discharge Date Hospital Service Auth/Cert Status   01/15/24  2239  Behavioral Incomplete          Hospital Area Unit Room/Bed    [facility] 3 ACUTE BEHAV /01              Hospital Account    Name Acct ID Class Status Primary Coverage   Willi Burnett [id number] BEHAVIOR IP Open BCBS - [place]            Guarantor Account (for Hospital 
Willi is alert and verbal. Discharged to home/self care to continue recommended plan of care. Discharge instructions reviewed with the patient. He verbalized understanding. Patient belongings, valuables, and home medications returned. He was escorted to the ED exit where he was picked up by his mother to transport him to his destination.  
Willi is calm, cooperative, guarded, irritable, discharge focused. He denies all SI/HI/AVH/pain/depression/anxiety. He is medication and meal compliant. No distress observed. The only concern he expressed was getting discharged today. Informed him that the treatment team will talk to him about his discharge plan when they see him. He verbalized understanding and was receptive. Q 15 minutes checks ongoing to ensure patient safety.  
Writer spoke to pt mom. She will pick him up at 2pm.      Jazmín Wong, MSW student  
Writer spoke to pt mother Roshni ([phone]). Roshni reports pt was diagnosed with schizoaffective disorder while incarcerated. She reports pt exhibits paranoia, especially about being the antichrist and going to hell. She reports pt spends a lot of time watching conspiracy theories on the internet. Roshni reports pt was hospitalized last month for paranoia and threatening her and his brother. Roshni reports alcohol use plays a large part and makes symptoms worse. Roshni reports she is unsure if pt is taking antipsychotic everyday but she will manage medication upon discharge. Roshni reports pt often doubts he has schizophrenia. Roshni reports pt has not has suicidal ideation in the past and does not believe he was trying to kill himself by taking the trazadone. Roshni confirms pt took 9 pills. Roshni reports pt has poor insight into caffeine use and does not understand how it impacts sleep process. Roshni reports pt can return home but needs to stop drinking to stay there.      Jazmín Wong, social work student  
copy of patient rights given. Received admission packet,.  Consents reviewed, signed . Patient verbalize understanding:  .    Patient education on precautions                   Musa Elder RN

## 2024-01-18 NOTE — TRANSITION OF CARE
Behavioral Health Transition Record to Provider    Patient Name: Willi Abdi  YOB: 2000  Medical Record Number: [id number]  Date of Admission: 1/15/2024  Date of Discharge: 1/18/2024    Attending Provider: No att. providers found  Discharging Provider: Kye Núñez MD   To contact this individual call [phone] and ask the  to page.  If unavailable, ask to be transferred to Behavioral Health Provider on call.  A Behavioral Health Provider will be available on call 24/7 and during holidays.    Primary Care Provider: Humberto Swanson MD    No Known Allergies    Reason for Admission: The patient, Willi Abdi, is a 23 y.o.  Declined male with a past psychiatric history significant for schizophrenia, who presents at this time with complaints of (and/or evidence of) the following emotional symptoms: delusions, insomnia, and psychotic behavior.  Additional symptomatology include intentional overdose of sleep agents.  The above symptoms have been present for 2+ weeks. These symptoms are of moderate to high severity. These symptoms are constant in nature.  The patient's condition has been precipitated by psychosocial stressors. UDS: negative; BAL=0.     The patient presented to the ED with c/o insomnia, he was noted to be disorganized and delusional. He was admitted due to psychiatric decompensation despite stated medication compliance. He was noted to be in marked distress, stating he is becoming \"more Hoahaoism\" and with fear about the afterlife and unknown.      The patient is a fair historian. The patient corroborates the above narrative. The patient contracts for safety on the unit and gives consent for the team to contact collateral. The patient is amenable to initiating treatment while on the unit. He stats that he did not mean to take his own life when he overdosed on the medication, though he acknowledges that his family does believe this was the case. He was previously

## 2024-01-19 NOTE — DISCHARGE SUMMARY
CONTINUE taking these medications      pantoprazole 20 MG tablet  Commonly known as: PROTONIX  Take 1 tablet by mouth daily            STOP taking these medications      mirtazapine 30 MG tablet  Commonly known as: REMERON     risperiDONE 2 MG tablet  Commonly known as: RISPERDAL     traZODone 100 MG tablet  Commonly known as: DESYREL               Where to Get Your Medications        These medications were sent to [id number] - [place], [place] - [address] - P [phone] - F [phone] [address] [address]      Phone: [phone]   hydrOXYzine HCl 50 MG tablet  OLANZapine 15 MG tablet  pantoprazole 20 MG tablet  zolpidem 10 MG tablet                A coordinated, multidisplinary treatment team round was conducted with Willi Abdi---this is done daily here at [facility]. This team consists of the nurse, psychiatric unit pharmacist,  and writer.     I have spent greater than 35 minutes on discharge work.    Signed:  Kye Núñez MD  1/18/2024

## 2025-06-20 ENCOUNTER — TELEPHONE (OUTPATIENT)
Age: 25
End: 2025-06-20

## 2025-06-20 NOTE — TELEPHONE ENCOUNTER
Pt needs a referral for Hep C as he was donating plasma and found this out.    Pt has paperwork as well from plasma center.    Pt will need an appt for this as soon as possible.     Please call dad

## 2025-06-26 NOTE — TELEPHONE ENCOUNTER
Called, no answer left message to call office back.  Pt needs an appt. Per Dr. Swanson 06/30/25 is fine.